# Patient Record
(demographics unavailable — no encounter records)

---

## 2024-10-15 NOTE — PHYSICAL EXAM
[Flexion] : flexion [Extension] : extension [Bending to left] : bending to left [Bending to right] : bending to right [] : antalgic

## 2024-10-15 NOTE — PROCEDURE
[Trigger point 1-2 muscle groups] : trigger point 1-2 muscle groups [Left] : of the left [___ cc    2%] : Lidocaine ~Vcc of 2%  [___ cc    40mg] : Triamcinolone (Kenalog) ~Vcc of 40 mg  [] : Patient tolerated procedure well [Call if redness, pain or fever occur] : call if redness, pain or fever occur [Apply ice for 15min out of every hour for the next 12-24 hours as tolerated] : apply ice for 15 minutes out of every hour for the next 12-24 hours as tolerated [Patient was advised to rest the joint(s) for ____ days] : patient was advised to rest the joint(s) for [unfilled] days [Previous OTC use and PT nontherapeutic] : patient has tried OTC's including aspirin, Ibuprofen, Aleve, etc or prescription NSAIDS, and/or exercises at home and/or physical therapy without satisfactory response [Patient had decreased mobility in the joint] : patient had decreased mobility in the joint [Risks, benefits, alternatives discussed / Verbal consent obtained] : the risks benefits, and alternatives have been discussed, and verbal consent was obtained

## 2024-10-15 NOTE — ASSESSMENT
[FreeTextEntry1] : - Today we were able to review prior MRI images of the lumbar spine from 2022 -Also we were able to review the MRI report of the lumbar images from 2023  -She has an exacerbation of known underlying lumbar HNP/stenosis -Prescription provided today for Medrol pack and Flexeril which has settled down her symptoms in the past -Will administer TPI today for acute pain -Prescription for physical therapy provided -Follow-up in 4 weeks if pain persists would consider updated MRI and possible referral for repeat epidural injection since that helped her acute symptoms last year

## 2024-10-15 NOTE — IMAGING
[Facet arthropathy] : Facet arthropathy [Disc space narrowing] : Disc space narrowing [Spondylolithesis] : Spondylolithesis [de-identified] : normal

## 2024-10-15 NOTE — HISTORY OF PRESENT ILLNESS
[Lower back] : lower back [de-identified] :  10/15/2024: Known lumbar history of herniated disks levels 2 3, 3 4, 4 5 with left-sided stenosis.  She was under the care of another spine specialist last year.  She did well with LESI treatments and return to full activities. She presents today stating exacerbation of lower back pain with left leg radicular symptoms that came on yesterday after lifting a mattress.  She has been ambulating with a limp since then  Lumbar MRI impression from stand-up MRI dated 10/9/2023-shows herniated disc at L2-3, 3-4, and 4-5 with left-sided

## 2024-11-13 NOTE — HISTORY OF PRESENT ILLNESS
CBCM Referral  [facility] added name to care team.    Will complete EHR review, contact client to offer CBCM services and request assistance from [facility] if client agrees to services.   [de-identified] : Patient 63-year-old right-hand-dominant female presents with a couple week history of right wrist and thumb pain.  Says it bothers her when she lifts her thumb up and she is been using a brace.  She denies any injury.

## 2024-11-13 NOTE — IMAGING
[Right] : right wrist [de-identified] : She is alert and oriented vital signs are stable.  Examination of right wrist reveals minimal swelling.  She is tenderness around the first dorsal compartment.  She has a positive Finkelstein's test.  FPL and EPL are intact.  She has slight restricted range of motion on dorsiflexion.  No tenderness on the carpometacarpal joint.  No tenderness around the scaphoid volarly or dorsally or the scaphoid ligament or TFCC.  No tenderness on the distal radius.  She had full range of motion of the elbow. She had a normal neurologic exam. Normal vascular exam. Normal skin exam. No evidence for open wounds infection or compartment syndrome. [FreeTextEntry8] : X-ray right wrist 3 views revealed no fracture or dislocations. Ambulatory

## 2024-11-13 NOTE — ASSESSMENT
[FreeTextEntry1] : Right wrist DeQuervains tendinitis  Plan anti-inflammatories with GI precautions, ice 20 minutes on 20 minutes off and she could try Voltaren gel or cream.  I also recommend she continue the thumb spica brace.  I also discussed with this time about the options for cortisone injection.  At this time she would like to try cortisone injection.  Under sterile conditions a cortisone injection was administered to the right wrist first dorsal compartment.  She tolerated the procedure well.  She will follow-up she requires a second injection understand she could require surgery in the future.  All questions were answered she is agreeable with the plan.  Patient was advised if they develop any severe pain, numbness or tingling or pain with range of motion to the fingers they must call or go to the emergency room immediately. All the signs and symptoms of compartment syndrome were explained.  The patient understands.  This medical record was created utilizing Dragon voice recognition software.  This software is not perfect and may occasionally create typographical errors which may be reflected in the above medical record.

## 2024-11-19 NOTE — IMAGING
[Facet arthropathy] : Facet arthropathy [Disc space narrowing] : Disc space narrowing [Spondylolithesis] : Spondylolithesis [de-identified] : normal

## 2024-11-19 NOTE — HISTORY OF PRESENT ILLNESS
[Lower back] : lower back [de-identified] : 11/18/24: Follow up [facility]. Hasn't started PT yet, however, pain has slighly decreased.   10/15/2024:Known lumbar history of herniated disks levels 2 3, 3 4, 4 5 with left-sided stenosis.  She was under the care of another spine specialist last year.  She did well with LESI treatments and return to full activities. She presents today stating exacerbation of lower back pain with left leg radicular symptoms that came on yesterday after lifting a mattress.  She has been ambulating with a limp since then  Lumbar MRI impression from stand-up MRI dated 10/9/2023-shows herniated disc at L2-3, 3-4, and 4-5 with left-sided

## 2024-11-19 NOTE — ASSESSMENT
[FreeTextEntry1] : 64 y/o F with known spinal stenosis with LLE radiculopathy x 1 month. Sxs have slightly improved with muscle relaxers. Hasn't started PT  - Encouraged starting PT and focus on core strengthen  - F/up in 4-6 weeks

## 2024-12-02 NOTE — PROCEDURE
[Tendon Sheath] : tendon sheath [Right] : of the right [De Tj's] : reese rich's [Pain] : pain [Inflammation] : inflammation [Alcohol] : alcohol [Betadine] : betadine [Ethyl Chloride sprayed topically] : ethyl chloride sprayed topically [Sterile technique used] : sterile technique used [___ cc    6mg] :  Betamethasone (Celestone) ~Vcc of 6mg [___ cc    1%] : Lidocaine ~Vcc of 1%  [] : Patient tolerated procedure well [Call if redness, pain or fever occur] : call if redness, pain or fever occur [Apply ice for 15min out of every hour for the next 12-24 hours as tolerated] : apply ice for 15 minutes out of every hour for the next 12-24 hours as tolerated [Risks, benefits, alternatives discussed / Verbal consent obtained] : the risks benefits, and alternatives have been discussed, and verbal consent was obtained [FreeTextEntry3] : Risks, benefits, and alternatives of the cortisone injection were explained to the patient prior to procedure. Risk included to but not limited to increase in sugars if the patient has diabetes and they must check and monitor their sugars, infection, bleeding, vasovagal episode, injury to the nerves, pain at injection site, swelling, loss ROM, hypopigmentation at injection site, fat atrophy, facial flushing and if any facial flushing occurs, they were advised to take Benadryl OTC. Ice to injection site as needed 20 minutes on and off. Patient understands all risks and agrees to the injection with verbal consent. Patient tolerated the procedure well.

## 2024-12-02 NOTE — HISTORY OF PRESENT ILLNESS
[de-identified] : Patient 63-year-old right-hand-dominant female presents with a couple week history of right wrist and thumb pain.  Says it bothers her when she lifts her thumb up and she is been using a brace.  She denies any injury.  December 2, 2024.  Patient presents for follow-up for her right wrist/thumb.

## 2024-12-02 NOTE — IMAGING
[Right] : right wrist [de-identified] : She is alert and oriented vital signs are stable.  Examination of right wrist reveals minimal swelling.  She has tenderness around the first dorsal compartment.  She has a positive Finkelstein's test.  FPL and EPL are intact.  She has slight restricted range of motion on dorsiflexion.  No tenderness on the carpometacarpal joint.  No tenderness around the scaphoid volarly or dorsally or the scaphoid ligament or TFCC.  No tenderness on the distal radius.  She had full range of motion of the elbow. She had a normal neurologic exam. Normal vascular exam. Normal skin exam. No evidence for open wounds infection or compartment syndrome. [FreeTextEntry8] : X-ray right wrist 3 views revealed no fracture or dislocations.

## 2024-12-02 NOTE — ASSESSMENT
[FreeTextEntry1] : Right wrist DeQuervains tendinitis  Plan anti-inflammatories with GI precautions, ice 20 minutes on 20 minutes off and she could try Voltaren gel or cream.  I also recommend she continue the thumb spica brace.  I also discussed with this time about the options for a 2nd cortisone injection.  At this time she would like to try a 2nd cortisone injection.  Under sterile conditions a 2nd cortisone injection was administered to the right wrist first dorsal compartment.  She tolerated the procedure well.   I did give her the option for occupational therapy she declined.  I did advise her if the injection does not solve the problem then should be a candidate for surgery and should follow-up with one of the operative hand surgeons Dr. Bush for consultation.  All questions were answered she is agreeable with the plan.   Patient was advised if they develop any severe pain, numbness or tingling or pain with range of motion to the fingers they must call or go to the emergency room immediately. All the signs and symptoms of compartment syndrome were explained.  The patient understands.  This medical record was created utilizing Dragon voice recognition software.  This software is not perfect and may occasionally create typographical errors which may be reflected in the above medical record.

## 2025-04-28 NOTE — IMAGING
[Facet arthropathy] : Facet arthropathy [Disc space narrowing] : Disc space narrowing [Spondylolithesis] : Spondylolithesis [de-identified] : L Spine Inspection: No defects or deformities Palpation: Spasms in b/l lumbar paraspinal musculature ROM: diminished in all planes Strength: 5/5 b/l hip flexors, knee extensors, ankle dorsiflexors, EHL, ankle plantarflexors Neuro: Sensation LT I Negative b/l SLR Toe and heal walk intact Gait non antalgic  [de-identified] : normal

## 2025-04-28 NOTE — REASON FOR VISIT
Pt called and left VM needing clarification on next steps for port placement.  Pt advised that Dr Naik's surgical scheduler will be calling and to reach out to this ONN for any further assistance; pt verbalized understanding.  
[FreeTextEntry2] : Follow up lower back pain.
[FreeTextEntry2] : Follow up lower back pain.

## 2025-04-28 NOTE — HISTORY OF PRESENT ILLNESS
[Lower back] : lower back [de-identified] : 4/28/25: Follow up [facility]. Was doing okay up until 3 days ago when pain returned. No injury. Pain across lower back radiates into BLE.   11/18/24: Follow up [facility]. Hasn't started PT yet, however, pain has slightly decreased.   10/15/2024:Known lumbar history of herniated disks levels 2 3, 3 4, 4 5 with left-sided stenosis.  She was under the care of another spine specialist last year.  She did well with LESI treatments and return to full activities. She presents today stating exacerbation of lower back pain with left leg radicular symptoms that came on yesterday after lifting a mattress.  She has been ambulating with a limp since then  Lumbar MRI impression from stand-up MRI dated 10/9/2023-shows herniated disc at L2-3, 3-4, and 4-5 with left-sided

## 2025-04-28 NOTE — IMAGING
[Facet arthropathy] : Facet arthropathy [Disc space narrowing] : Disc space narrowing [Spondylolithesis] : Spondylolithesis [de-identified] : L Spine Inspection: No defects or deformities Palpation: Spasms in b/l lumbar paraspinal musculature ROM: diminished in all planes Strength: 5/5 b/l hip flexors, knee extensors, ankle dorsiflexors, EHL, ankle plantarflexors Neuro: Sensation LT I Negative b/l SLR Toe and heal walk intact Gait non antalgic  [de-identified] : normal

## 2025-04-28 NOTE — HISTORY OF PRESENT ILLNESS
[Lower back] : lower back [de-identified] : 4/28/25: Follow up [facility]. Was doing okay up until 3 days ago when pain returned. No injury. Pain across lower back radiates into BLE.   11/18/24: Follow up [facility]. Hasn't started PT yet, however, pain has slightly decreased.   10/15/2024:Known lumbar history of herniated disks levels 2 3, 3 4, 4 5 with left-sided stenosis.  She was under the care of another spine specialist last year.  She did well with LESI treatments and return to full activities. She presents today stating exacerbation of lower back pain with left leg radicular symptoms that came on yesterday after lifting a mattress.  She has been ambulating with a limp since then  Lumbar MRI impression from stand-up MRI dated 10/9/2023-shows herniated disc at L2-3, 3-4, and 4-5 with left-sided

## 2025-04-28 NOTE — PROCEDURE
[FreeTextEntry3] : Trigger Point Injection- The risks, benefits, contents and alternatives to injection were explained in full to the patient. Risks outlined include but are not limited to infection, bleeding, scarring, skin discoloration, temporary increase in pain, syncopal episode, failure to resolve symptoms, allergic reaction, flare reaction, permanent white skin discoloration, symptom recurrence, and elevation of blood sugar in diabetics. Patient understood the risks. All questions were answered. After discussion of options, patient requested an injection. Oral informed consent was obtained, and sterile prep was done of the injection site. Sterile technique was used to introduce the mixture. The mixture consisted of:4 cc 1% lidocaine 1.5 mg of triamcinolone or Kenalog 40mg  Patient tolerated the procedure well. Patient advised to ice the injection site this evening. Signs and symptoms of infection reviewed, and patient advised to call immediately for redness, fevers, and/or chills.

## 2025-04-28 NOTE — ASSESSMENT
[FreeTextEntry1] : 64 y/o F with known spinal stenosis presenting with acute flare up of lower back pain and BLE radic x 3 days. Exam: consistent with spasm and limited ROM, no weakness. Sxs responded well to TPI and MDP pack in the past.   - Gave left lumbar paraspinal muscle TPI, tolerated well.  - Prescribed MDP  - If sxs persist will call us and order [facility] MRI.

## 2025-04-28 NOTE — ASSESSMENT
[FreeTextEntry1] : 62 y/o F with known spinal stenosis presenting with acute flare up of lower back pain and BLE radic x 3 days. Exam: consistent with spasm and limited ROM, no weakness. Sxs responded well to TPI and MDP pack in the past.   - Gave left lumbar paraspinal muscle TPI, tolerated well.  - Prescribed MDP  - If sxs persist will call us and order [facility] MRI.

## 2025-05-21 NOTE — HISTORY OF PRESENT ILLNESS
[de-identified] : 63F presents for an evaluation of a tongue lesion. Patient saw a raised brownish/black lesion on tongue for a few weeks on her posterior/midline tongue. States she felt like something was stuck on her tongue. Patient noticed gradual improvement and resolution of lesion for the past few days. Patient smoked 0.5ppd x50 years. Rare alcohol use. Patient denies fevers, chills, fatigue, unintentional weight loss, oral bleeding, tongue pain, heat/cold sensitivity, dysgeusia, dysphagia, odynophagia, globus, dysphonia, or otalgia.

## 2025-05-21 NOTE — CONSULT LETTER
[Dear  ___] : Dear  [unfilled], [Please see my note below.] : Please see my note below. [Sincerely,] : Sincerely, [Consult Letter:] : I had the pleasure of evaluating your patient, [unfilled]. [Consult Closing:] : Thank you very much for allowing me to participate in the care of this patient.  If you have any questions, please do not hesitate to contact me. [FreeTextEntry2] : Robby Jacob, DO  [FreeTextEntry3] : Jane Beltrán PA-C Department of Otolaryngology [facility] at [place]   Deep France MD, FACS Chief of Otolaryngology at [facility]  Dept. of Otolaryngology [facility]

## 2025-05-21 NOTE — PHYSICAL EXAM
[Midline] : trachea located in midline position [Normal] : no rashes [de-identified] : No lesion seen

## 2025-07-02 NOTE — ADDENDUM
[FreeTextEntry1] : This note was written by Paco De Guzman on 07/02/2025 acting as scribe for Dr. Bryson Lora M.D. I, Bryson Lora MD, have read and attest that all the information, medical decision making and discharge instructions within are true and accurate.

## 2025-07-02 NOTE — PHYSICAL EXAM
[Normal] : Gait: normal [Montano's Sign] : negative Montano's sign [Pronator Drift] : negative pronator drift [SLR] : negative straight leg raise [de-identified] : 5 out of 5 motor strength, sensation is intact and symmetrical full range of motion flexion extension and rotation, no palpatory tenderness full range of motion of hips knees shoulders and elbows (all four extremities), no atrophy, negative straight leg raise, no pathological reflexes, no swelling, normal ambulation, no apparent distress skin is intact, no palpable lymph nodes, no upper or lower extremity instability, alert and oriented x3 and normal mood. Normal finger-to nose test. Left SI joint pain.     [de-identified] : MAGNETIC RESONANCE IMAGING OF THE LUMBAR SPINE    6/7/25  (Stand Up MRI)      TECHNIQUE: Multiplanar, multisequential MRI was performed in the neutral/sitting position.     HISTORY: Left lumbar radiculopathy (M54.16). Disc degeneration, lumbar (M51.369).     COMPARISON: Examination is compared to previous MRI study of the lumbar spine dated 10/09/2023.     INTERPRETATION: At the L5-S1 disc space level, disc bulge is noted deforming the thecal sac abutting the proximal S1 nerve roots bilaterally with bilateral neural foraminal extension abutting the exiting L5 nerve roots. Loss of disc signal with preservation of disc space height.     T11-12 - L4-5 disc degenerative changes are noted with loss of disc space height and signal, anterior hypertrophic changes, and anterior disc extension. There is interval development of prominent bone marrow signal changes within the endplates adjacent to the L3-4 disc space within the L3 and L4 vertebral bodies. Signal elevation is also noted within the L3-4 disc. Intervertebral development of prevertebral soft tissue edema is not appreciated. Interval development of epidural abscess formation is not appreciated. Likely etiology of prominent progressive Modic type I endplate signal changes associated with disc degeneration. Alternative differential diagnostic possibility of discitis/osteomyelitis would be considered less likely. Modic type II endplate signal changes are noted at L2-3 and T11-12. Schmorl's node formation is noted at T11-12 and T12-L1.     At L4-5, disc herniation is noted with grade I spondylolisthesis deforming the thecal sac abutting the proximal L5 nerve roots bilaterally with bilateral neural foraminal extension, left greater than right, abutting the exiting left L4 nerve root. Nonspecific bone marrow signal changes are noted within the left pars region. Etiology secondary to reactive change in conjunction with underlying spondylolysis would be considered.     At L3-4, disc herniation is noted deforming the thecal sac demonstrating left lateral recess extension impinging the left L4 nerve root, also abutting the proximal right L4 nerve root with grade I retrolisthesis. Bilateral neural foraminal extension, left greater than right, abutting the exiting left L3 nerve root contributing to left neural foraminal narrowing in conjunction with facet and ligamentous hypertrophy.     At L2-3, disc herniation is noted deforming the thecal sac abutting the proximal L3 nerve roots bilaterally with bilateral neural foraminal extension, right greater than left, abutting the exiting right L2 nerve root.     At L1-2, disc herniation is noted deforming the thecal sac abutment of the proximal L2 nerve roots bilaterally, with bilateral inferior neural foraminal extension.     At T12-L1, disc herniation is noted deforming the thecal sac abutting the proximal left L1 nerve root without evidence of neural foraminal extension.     There is only limited assessment provided of the lower thoracic spine at the peripheral margin of the included field of view.     Lumbar spine curvature is noted with rightward convexity.     Lumbar spine straightening is noted, nonspecific finding, which meets criteria for muscle spasm.     There is no evidence of lumbar vertebral body compression fracture. There is no evidence of signal elevation within the conus medullaris located at the approximate T12-L1 disc space level.     Prominent bone marrow signal changes within the endplates adjacent to the L3-4 disc space represent interval change compared to prior exam. There is increased size of the left paracentral component of the L3-4 disc herniation compared to prior exam. L1-2 and T12-L1 disc herniations are currently identified. L1-2 and T12-L1 disc bulges were previously identified. Nonspecific bone marrow signal change within the left pars region in association with the L4-5 grade I spondylolisthesis represents interval change compared to prior exam.     IMPRESSION:     L4-5, L3-4, L2-3, L1-2, and T12-L1 disc herniations deforming the thecal sac with L1-2 - L4-5 bilateral neural foraminal extension abutting the exiting left-sided nerve roots at L3-4 and L4-5, abutting the exiting right L2 nerve root at L2-3, L3-4 left lateral recess extension impinging the left L4 nerve root, also abutting the proximal right L4 nerve root with grade I retrolisthesis, L4-5, L2-3, and L1-2 abutment of the proximal nerve roots bilaterally, T12-L1 abutment of the proximal left L1 nerve root, L3-4 left neural foraminal narrowing in conjunction with facet and ligamentous hypertrophy, L4-5 grade I spondylolisthesis with nonspecific bone marrow signal in the left pars region. Etiology secondary to reactive changes in conjunction with underlying spondylolysis would be considered.    L5-S1 disc bulge abutting the proximal S1 nerve roots bilaterally with bilateral neural foraminal extension abutting the exiting L5 nerve roots.    T11-12 - L4-5 disc degenerative changes. Prominent nonspecific bone marrow signal changes within the endplates adjacent to the L3-4 disc space within the L3 and L4 vertebral bodies representing interval change compared to prior exam. Etiology secondary to prominent progressive Modic type I endplate signal changes in association with L3-4 disc degeneration would be considered. Alternative differential diagnostic possibility of discitis/osteomyelitis would be considered less likely. Clinical correlation and correlation with white blood cell count, ESR, and other inflammatory markers, and short-term follow-up evaluation recommended for additional assessment.    Lumbar spine curvature with rightward convexity.    Lumbar spine straightening.    XR AP Lat Lumbar 10/02/2023-lumbar disc degenerative disease, decreased lordosis-reviewed with the patient.  I reviewed, interpreted and clinically correlated the following outside imaging studies, MR SPINE LUMBAR - ORDERED BY: TOAN BUSTILLO (PACS)   PROCEDURE DATE: 06/12/2022    INTERPRETATION: LUMBOSACRAL SPINE MRI  CLINICAL INFORMATION: Low back pain and left lower extremity radiculopathy. TECHNIQUE: Multiplanar, multisequence MR imaging was obtained of the lumbosacral spine. FINDINGS:  DISC LEVEL EVALUATION:  L1/L2: Minimal disc bulging without central canal or foraminal narrowing. L2/L3: Mild to moderate disc bulging with small osteophyte formation. Disc mildly flattens the ventral thecal sac without significant stenosis. There is mild foraminal narrowing. L3/L4: Mild to moderate disc bulging and osteophyte formation is present that is asymmetric to the left causing moderate to severe left lateral recess stenosis and foraminal narrowing. There is mild central canal stenosis. Minimal right foraminal narrowing is present. L4/L5: Moderate facet arthrosis with ligamentous thickening and grade 1 anterolisthesis. There is uncovering of the posterior aspect the disc along with mild to moderate disc bulging that is asymmetric to the left. There is moderate left foraminal narrowing with moderate to severe left lateral recess stenosis. Mild to moderate central canal stenosis is present. L5/S1: Mild to moderate facet arthrosis. No central canal or foraminal narrowing.  SPINAL ALIGNMENT: Mild scoliosis that is convex to the right. Grade 1 anterolisthesis at L4-L5. DISTAL CORD AND CONUS: The distal cord is normal in appearance. The conus terminates at L1 and is unremarkable. SI JOINTS: Normal MARROW: There is no bone marrow edema or fracture. PARASPINAL MUSCLE AND SOFT TISSUES: Normal INTRAABDOMINAL/INTRAPELVIC SOFT TISSUES: Normal  IMPRESSION: Mild to moderate multilevel lumbar spondylosis with multilevel disc bulging and facet arthrosis resulting in central canal and foraminal narrowing as detailed above and most advanced in the left lateral recess and foraminal regions at L3-L4 and L4-L5. Mild central canal stenosis at L3-L4 with mild to moderate central canal stenosis at L4-L5.  --- End of Report ---       HIGINIO RAYMUNDO MD; Attending Radiologist This document has been electronically signed. Joselo 15 2022 3:55PM.

## 2025-07-02 NOTE — HISTORY OF PRESENT ILLNESS
[Stable] : stable [de-identified] : 63 year old female patient presents for an initial evaluation of ongoing lower back pain with radiation down the lower extremities for the past two months. Last seen in Oct 2023 for left lumbar radiculopathy and left SI joint pain, and was given an SI at the time. The pain radiates down the legs to the toes, with numbness/tingling, L>R.  Denies weakness.  Getting out of bed and from a seated position, and twisting motions aggravates the pain. Pain is worse in the mornings and improves throughout the day. Takes tylenol for the pain.  Has last tried PT in 2023 but felt worse. S/P LESI in 2023 which helped at the time, no recent injections. Has MRI Lumbar. PMHx: HLD  No fever chills sweats nausea vomiting no bowel or bladder dysfunction, no recent weight loss or gain no night pain. This history is in addition to the intake form that I personally reviewed.

## 2025-07-02 NOTE — DISCUSSION/SUMMARY
[de-identified] : L4-5 spondylolisthesis. Left L3-4 herniation. Left sacroiliitis Discussed all options. I offered an injection after all risks were explained including allergic reaction to an infection under sterile conditions 1 mg of Depo-Medrol and 2 cc of 1% Lidocaine without epinephrine was injected into the painful site. The patient tolerated the procedure well and received significant relief following the injection.  Medrol dosepak PRN. All options discussed including rest, medicine, home exercise, acupuncture, Chiropractic care, Physical Therapy, Pain management, and last resort surgery. All questions were answered, all alternatives discussed and the patient is in complete agreement with the treatment plan which the patient contributed to and discussed with me through the shared decision-making process. Follow-up appointment as instructed. Any issues and the patient will call or come in sooner. Follow-up appointment as instructed. Any issues and the patient will call or come in sooner.